# Patient Record
Sex: MALE | Race: BLACK OR AFRICAN AMERICAN
[De-identification: names, ages, dates, MRNs, and addresses within clinical notes are randomized per-mention and may not be internally consistent; named-entity substitution may affect disease eponyms.]

---

## 2017-02-04 ENCOUNTER — HOSPITAL ENCOUNTER (INPATIENT)
Dept: HOSPITAL 74 - YASAS | Age: 47
LOS: 5 days | Discharge: OTHER INSTITUTION | DRG: 773 | End: 2017-02-09
Attending: INTERNAL MEDICINE | Admitting: INTERNAL MEDICINE
Payer: COMMERCIAL

## 2017-02-04 VITALS — BODY MASS INDEX: 31.1 KG/M2

## 2017-02-04 DIAGNOSIS — F60.9: ICD-10-CM

## 2017-02-04 DIAGNOSIS — F43.10: ICD-10-CM

## 2017-02-04 DIAGNOSIS — F10.230: ICD-10-CM

## 2017-02-04 DIAGNOSIS — F11.23: Primary | ICD-10-CM

## 2017-02-04 DIAGNOSIS — Z59.0: ICD-10-CM

## 2017-02-04 DIAGNOSIS — F17.210: ICD-10-CM

## 2017-02-04 DIAGNOSIS — F13.230: ICD-10-CM

## 2017-02-04 DIAGNOSIS — F33.9: ICD-10-CM

## 2017-02-04 DIAGNOSIS — F19.24: ICD-10-CM

## 2017-02-04 DIAGNOSIS — F14.20: ICD-10-CM

## 2017-02-04 LAB
APPEARANCE UR: CLEAR
BILIRUB UR STRIP.AUTO-MCNC: NEGATIVE MG/DL
COLOR UR: YELLOW
KETONES UR QL STRIP: NEGATIVE
LEUKOCYTE ESTERASE UR QL STRIP.AUTO: NEGATIVE
NITRITE UR QL STRIP: NEGATIVE
PH UR: 6 [PH] (ref 5–8)
PROT UR QL STRIP: NEGATIVE
PROT UR QL STRIP: NEGATIVE
RBC # UR STRIP: NEGATIVE /UL
SP GR UR: 1.02 (ref 1–1.03)
UROBILINOGEN UR STRIP-MCNC: NEGATIVE E.U./DL (ref 0.2–1)

## 2017-02-04 RX ADMIN — Medication SCH MG: at 22:50

## 2017-02-04 SDOH — ECONOMIC STABILITY - HOUSING INSECURITY: HOMELESSNESS: Z59.0

## 2017-02-04 NOTE — EKG
Test Reason : 

Blood Pressure : ***/*** mmHG

Vent. Rate : 074 BPM     Atrial Rate : 074 BPM

   P-R Int : 156 ms          QRS Dur : 086 ms

    QT Int : 370 ms       P-R-T Axes : 063 033 022 degrees

   QTc Int : 410 ms

 

NORMAL SINUS RHYTHM

NORMAL ECG

NO PREVIOUS ECGS AVAILABLE

Confirmed by JAYRO CASTELLANO MD ([id number]) on 2/4/2017 4:52:22 PM

 

Referred By:             Confirmed By:JAYRO CASTELLANO MD

## 2017-02-04 NOTE — HP
COWS





- Scale


Resting Pulse: 1= OR 


Sweatin= Chills/Flushing


Restless Observation: 1= Difficult to Sit Still


Pupil Size: 0= Normal to Room Light


Bone or Joint Aches: 2= Severe Diffuse Aches


Runny Nose/ Eye Tearin= Runny Nose/Eyes


GI Upset > 30mins: 3= Vomiting/Diarrhea


Tremor Observation: 2= Slight Tremor Visible


Yawning Observation: 0= None


Anxiety or Irritability: 2=Irritable/Anxious


Goose Flesh Skin: 3=Piloerection


COWS Score: 17





CIWA Score





- CIWA Score


Nausea/Vomitin


Muscle Tremors: 4-Moderate,w/Arms Extend


Anxiety: 3


Agitation: 1-Slight > Activity


Paroxysmal Sweats: 3


Orientation: 0-Oriented


Tacttile Disturbances: 0-None


Auditory Disturbances: 0-None


Visual Disturbances: 0-None


Headache: 3-Moderate


CIWA-Ar Total Score: 19





Admission ROS S





- HPI


Chief Complaint: 


"I am here to get clean again."


Allergies/Adverse Reactions: 


 Allergies











Allergy/AdvReac Type Severity Reaction Status Date / Time


 


Fish Containing Products Allergy Severe Rash Verified 17 12:15


 


tomato [Tomato] Allergy Severe Vomiting Verified 17 12:15


 


liver Allergy Severe Vomiting Uncoded 17 12:15


 


squash Allergy Severe Nausea Uncoded 17 12:15


 


NKDA Allergy   Uncoded 17 12:15











History of Present Illness: 


Pt. is a 45 YO male here to Detox from Heroin and alcohol. Pt. has had several 

previous detox and Rehab admissions at [facility] over the last few years. Pt. also 

uses Xanax on a daily basis.


Exam Limitations: No Limitations





- Ebola screening


Have you traveled outside of the country in the last 21 days: No


Have you had contact with anyone from an Ebola affected area: No


Have you been sick,other than usual withdrawal symptoms: No


Do you have a fever: No





- Review of Systems


Constitutional: Chills, Diaphoresis, Fever, Loss of Appetite, Malaise, Night 

Sweats, Changes in sleep, Unintentional Wgt. Loss (Lost 5- 10 lbs. over last 

month.)


EENT: reports: Tearing, Nose Congestion, Sinus Pressure


Respiratory: reports: No Symptoms reported


Cardiac: reports: No Symptoms Reported


GI: reports: Diarrhea, Nausea, Poor Appetite, Vomiting, Abdominal cramping


: reports: No Symptoms Reported


Musculoskeletal: reports: Joint Pain, Muscle Pain, Joint Stiffness


Integumentary: reports: No Symptoms Reported


Neuro: reports: Headache, Tremors


Endocrine: reports: No Symptoms Reported


Hematology: reports: No Symptoms Reported


Psychiatric: reports: Judgement Intact, Mood/Affect Appropiate, Orientated x3, 

Agitated, Anxious


Other Systems: Reviewed and Negative





Patient History





- Patient Medical History


Hx Anemia: No


Hx Asthma: No


Hx Chronic Obstructive Pulmonary Disease (COPD): No


Hx Cancer: No


Hx Cardiac Disorders: No


Hx Congestive Heart Failure: No


Hx Hypertension: No


Hx Hypercholesterolemia: No


Hx Pacemaker: No


HX Cerebrovascular Accident: No


Hx Seizures: No


Hx Diabetes: No


Hx Gastrointestinal Disorders: No


Hx Liver Disease: No


Hx Genitourinary Disorders: No


Hx Sexually Transmitted Disorders: No


Hx Renal Disease (ESRD): No


Hx Thyroid Disease: No


Hx Human Immunodeficiency Virus (HIV): No (NEGATIVE HX; Last Tested approx. 3 

months ago.)


Hx Hepatitis C: No (NEGATIVE HX; Last Tested approx. 3 months ago.)


Hx Depression: No


Hx Suicide Attempt: No (PATIENT DENIES CURRENT SI / HI.)


Hx Bipolar Disorder: No


Hx Schizophrenia: No





- Patient Surgical History


Past Surgical History: No


Hx Neurologic Surgery: No


Hx Cataract Extraction: No


Hx Cardiac Surgery: No


Hx Lung Surgery: No


Hx Breast Surgery: No


Hx Breast Biopsy: No


Hx Abdominal Surgery: No


Hx Appendectomy: No


Hx Cholecystectomy: No


Hx Genitourinary Surgery: No


Hx Orthopedic Surgery: No


Anesthesia Reaction: No





- PPD History


Previous Implant?: Yes


Documented Results: Negative w/o proof


Implanted On Prior [facility] Admission?: Yes


Date: 11/18/15


Results: 0 mm


PPD to be Administered?: Yes





- Reproductive History


Patient is a Female of Child Bearing Age (11 -55 yrs old): No (PATIENT IS MALE.)





- Smoking Cessation


Smoking history: Former smoker


Have you smoked in the past 12 months: No


Aproximately how many cigarettes per day: 0


If you are a former smoker, when did you quit?: 2015


Cigars Per Day: 0


Hx Chewing Tobacco Use: No


Initiated information on smoking cessation: No





- Substances Abused


  ** Heroin


Route: Inhalation


Frequency: Daily


Amount used: 7 BAGS


Age of first use: 38


Date of Last Use: 17





  ** Cocaine


Route: Inhalation


Frequency: Daily


Amount used: 7 BAGS


Age of first use: 38


Date of Last Use: 17





  ** Alcohol


Route: Oral


Frequency: Daily


Amount used: 12 BEER BUDWEISER


Age of first use: 20


Date of Last Use: 17





  ** Alprazolam (Xanax)


Route: Oral


Frequency: Daily


Amount used: 7 STICKS


Age of first use: 25


Date of Last Use: 17





Family Disease History





- Family Disease History


Family History: Denies





Admission Physical Exam [facility]





- Vital Signs


Vital Signs: 


 Vital Signs - 24 hr











  17





  12:15


 


Temperature 97.5 F L


 


Pulse Rate 84


 


Respiratory 20





Rate 


 


Blood Pressure 114/71














- Physical


General Appearance: Yes: No Apparent Distress, Nourished, Appropriately Dressed

, Tremorous, Irritable, Anxious


HEENTM: Yes: Hearing grossly Normal, Normocephalic, Normal Voice, MERNA, Pharynx 

Normal


Respiratory: Yes: Chest Non-Tender, Lungs Clear, No Respiratory Distress


Neck: Yes: No masses,lesions,Nodules, Supple, Trachea in good position


Breast: Yes: Breast Exam Deferred


Cardiology: Yes: Regular Rhythm, Regular Rate, S1, S2


Abdominal: Yes: Normal Bowel Sounds, Non Tender, Soft


Genitourinary: Yes: Within Normal Limits


Back: Yes: Normal Inspection


Musculoskeletal: Yes: Gait Steady, Joint Stiffness


Extremities: Yes: Normal Inspection, Non-Tender, Tremors


Neurological: Yes: Fully Oriented, Alert, Normal Mood/Affect, Normal Response


Integumentary: Yes: Normal Color, Dry, Warm


Lymphatic: Yes: Within Normal Limits





- Diagnostic


(1) Alcohol dependence with uncomplicated withdrawal


Current Visit: Yes   Status: Acute





(2) Sedative, hypnotic or anxiolytic dependence with withdrawal, uncomplicated


Current Visit: Yes   Status: Acute





(3) Cocaine dependence, uncomplicated


Current Visit: Yes   Status: Acute





(4) Opioid dependence with withdrawal


Current Visit: Yes   Status: Acute








Cleared for Admission [facility]





- Detox or Rehab


[facility] Level of Care: Medically Managed


Detox Regimen/Protocol: Methadone/Librium





BHS Breath Alcohol Content


Breath Alcohol Content: 0





Urine Drug Screen





- Results


Drug Screen Negative: No


Urine Drug Screen Results: OPI-Opiates, MTD-Methadone

## 2017-02-05 LAB
ALBUMIN SERPL-MCNC: 3.4 G/DL (ref 3.4–5)
ALP SERPL-CCNC: 81 U/L (ref 45–117)
ALT SERPL-CCNC: 60 U/L (ref 12–78)
ANION GAP SERPL CALC-SCNC: 9 MMOL/L (ref 8–16)
AST SERPL-CCNC: 27 U/L (ref 15–37)
BILIRUB SERPL-MCNC: 0.3 MG/DL (ref 0.2–1)
CALCIUM SERPL-MCNC: 8.3 MG/DL (ref 8.5–10.1)
CO2 SERPL-SCNC: 27 MMOL/L (ref 21–32)
CREAT SERPL-MCNC: 1.1 MG/DL (ref 0.7–1.3)
DEPRECATED RDW RBC AUTO: 13.3 % (ref 11.9–15.9)
GLUCOSE SERPL-MCNC: 95 MG/DL (ref 74–106)
MCH RBC QN AUTO: 29.5 PG (ref 25.7–33.7)
MCHC RBC AUTO-ENTMCNC: 32.9 G/DL (ref 32–35.9)
MCV RBC: 89.8 FL (ref 80–96)
PLATELET # BLD AUTO: 142 K/MM3 (ref 134–434)
PMV BLD: 9.4 FL (ref 7.5–11.1)
PROT SERPL-MCNC: 6.6 G/DL (ref 6.4–8.2)
WBC # BLD AUTO: 5.4 K/MM3 (ref 4–10)

## 2017-02-05 RX ADMIN — Medication SCH MG: at 22:31

## 2017-02-05 RX ADMIN — Medication SCH TAB: at 10:32

## 2017-02-05 NOTE — PN
S CIWA





- CIWA Score


Nausea/Vomitin-Mild Nausea/No Vomiting


Muscle Tremors: 4-Moderate,w/Arms Extend


Anxiety: 4-Mod. Anxious/Guarded


Agitation: 4-Moderately Restless


Paroxysmal Sweats: No Perspiration


Orientation: 0-Oriented


Tacttile Disturbances: 1-Very Mild Itch/Numbness


Auditory Disturbances: 0-None


Visual Disturbances: 0-None


Headache: 2-Mild


CIWA-Ar Total Score: 16





BHS COWS





- Scale


Resting Pulse: 0= NV 80 or Below


Sweatin=Flushed/Facial Moisture


Restless Observation: 3= Extraneous Movement


Pupil Size: 0= Normal to Room Light


Bone or Joint Aches: 2= Severe Diffuse Aches


Runny Nose/ Eye Tearin= Nasal Congestion


GI Upset > 30mins: 3= Vomiting/Diarrhea


Tremor Observation of Outstretched Hands: 2= Slight Tremor Visible


Yawning Observation: 0= None


Anxiety or Irritability: 2=Irritable/Anxious


Goose Flesh Skin: 0=Smooth Skin


COWS Score: 15





BHS Progress Note (SOAP)


Subjective: 


Nausea, diarrhea, sweating, tremor, anxiety, restless, interrupted sleep (

patient request ambien 10mg tonight and psychiatry consult in AM for trazodone)


Objective: 





17 14:28


 Last Vital Signs











Temp Pulse Resp BP Pulse Ox


 


 96.8 F L  78   18   109/71    


 


 17 14:04  17 14:04  17 14:04  17 14:04   








 Laboratory Tests











  17





  19:00 07:20 07:20


 


WBC   5.4 


 


RBC   5.02 


 


Hgb   14.8 


 


Hct   45.0 


 


MCV   89.8 


 


MCHC   32.9 


 


RDW   13.3 


 


Plt Count   142 


 


MPV   9.4  D 


 


Sodium    138


 


Potassium    4.2


 


Chloride    102


 


Carbon Dioxide    27


 


Anion Gap    9


 


BUN    15


 


Creatinine    1.1  D


 


Creat Clearance w eGFR    > 60


 


Random Glucose    95  D


 


Calcium    8.3 L


 


Total Bilirubin    0.3  D


 


AST    27  D


 


ALT    60  D


 


Alkaline Phosphatase    81


 


Total Protein    6.6


 


Albumin    3.4


 


Urine Color  Yellow  


 


Urine Appearance  Clear  


 


Urine pH  6.0  


 


Ur Specific Gravity  1.024  


 


Urine Protein  Negative  


 


Urine Glucose (UA)  Negative  


 


Urine Ketones  Negative  


 


Urine Blood  Negative  


 


Urine Nitrite  Negative  


 


Urine Bilirubin  Negative  


 


Urine Urobilinogen  Negative  


 


Ur Leukocyte Esterase  Negative  


 


RPR Titer   














  17





  07:20


 


WBC 


 


RBC 


 


Hgb 


 


Hct 


 


MCV 


 


MCHC 


 


RDW 


 


Plt Count 


 


MPV 


 


Sodium 


 


Potassium 


 


Chloride 


 


Carbon Dioxide 


 


Anion Gap 


 


BUN 


 


Creatinine 


 


Creat Clearance w eGFR 


 


Random Glucose 


 


Calcium 


 


Total Bilirubin 


 


AST 


 


ALT 


 


Alkaline Phosphatase 


 


Total Protein 


 


Albumin 


 


Urine Color 


 


Urine Appearance 


 


Urine pH 


 


Ur Specific Gravity 


 


Urine Protein 


 


Urine Glucose (UA) 


 


Urine Ketones 


 


Urine Blood 


 


Urine Nitrite 


 


Urine Bilirubin 


 


Urine Urobilinogen 


 


Ur Leukocyte Esterase 


 


RPR Titer  Nonreactive








Labs noted


Assessment: 





17 14:28


Withdrawal symptoms


Plan: 


Continue detox


Ambien 10mg PO x 1 dose tonight and psychiatry consult in AM for rx for 

trazodone as per patient's request

## 2017-02-06 RX ADMIN — Medication SCH MG: at 22:50

## 2017-02-06 RX ADMIN — Medication SCH TAB: at 10:33

## 2017-02-06 RX ADMIN — METHADONE HYDROCHLORIDE SCH MG: 5 TABLET ORAL at 10:33

## 2017-02-06 NOTE — PN
S CIWA





- CIWA Score


Nausea/Vomitin


Muscle Tremors: 3


Anxiety: 2


Agitation: 3


Paroxysmal Sweats: 3


Orientation: 0-Oriented


Tacttile Disturbances: 0-None


Auditory Disturbances: 1-Very Mild


Visual Disturbances: 2-Mild Sensitivity


Headache: 0-None Present


CIWA-Ar Total Score: 19





BHS COWS





- Scale


Resting Pulse: 1= [place] 


Sweatin= Chills/Flushing


Restless Observation: 1= Difficult to Sit Still


Pupil Size: 0= Normal to Room Light


Bone or Joint Aches: 2= Severe Diffuse Aches


Runny Nose/ Eye Tearin= Nasal Congestion


GI Upset > 30mins: 3= Vomiting/Diarrhea


Tremor Observation of Outstretched Hands: 2= Slight Tremor Visible


Yawning Observation: 0= None


Anxiety or Irritability: 1=Feels Anxious/Irritable


Goose Flesh Skin: 3=Piloerection


COWS Score: 15





BHS Progress Note (SOAP)


Subjective: 


Sweating, Tremors, Interrupted sleep, Tremors, Vomiting, Body Aches.


Objective: 


PT. A &O X 3, OBSERVED AMBULATING ON UNIT.





17 14:00


 Vital Signs











Temperature  97.3 F L  17 13:24


 


Pulse Rate  86   17 13:24


 


Respiratory Rate  18   17 13:24


 


Blood Pressure  129/73   17 13:24


 


O2 Sat by Pulse Oximetry (%)      








 Laboratory Last Values











WBC  5.4 K/mm3 (4.0-10.0)   17  07:20    


 


RBC  5.02 M/mm3 (4.00-5.60)   17  07:20    


 


Hgb  14.8 GM/dL (11.7-16.9)   17  07:20    


 


Hct  45.0 % (35.4-49)   17  07:20    


 


MCV  89.8 fl (80-96)   17  07:20    


 


MCHC  32.9 g/dl (32.0-35.9)   17  07:20    


 


RDW  13.3 % (11.9-15.9)   17  07:20    


 


Plt Count  142 K/MM3 (134-434)   17  07:20    


 


MPV  9.4 fl (7.5-11.1)  D 17  07:20    


 


Sodium  138 mmol/L (136-145)   17  07:20    


 


Potassium  4.2 mmol/L (3.5-5.1)   17  07:20    


 


Chloride  102 mmol/L ()   17  07:20    


 


Carbon Dioxide  27 mmol/L (21-32)   17  07:20    


 


Anion Gap  9  (8-16)   17  07:20    


 


BUN  15 mg/dL (7-18)   17  07:20    


 


Creatinine  1.1 mg/dL (0.7-1.3)  D 17  07:20    


 


Creat Clearance w eGFR  > 60  (>60)   17  07:20    


 


Random Glucose  95 mg/dL ()  D 17  07:20    


 


Calcium  8.3 mg/dL (8.5-10.1)  L  17  07:20    


 


Total Bilirubin  0.3 mg/dL (0.2-1.0)  D 17  07:20    


 


AST  27 U/L (15-37)  D 17  07:20    


 


ALT  60 U/L (12-78)  D 17  07:20    


 


Alkaline Phosphatase  81 U/L ()   17  07:20    


 


Total Protein  6.6 g/dl (6.4-8.2)   17  07:20    


 


Albumin  3.4 g/dl (3.4-5.0)   17  07:20    


 


Urine Color  Yellow   17  19:00    


 


Urine Appearance  Clear   17  19:00    


 


Urine pH  6.0  (5.0-8.0)   17  19:00    


 


Ur Specific Gravity  1.024  (1.001-1.035)   17  19:00    


 


Urine Protein  Negative  (NEGATIVE)   17  19:00    


 


Urine Glucose (UA)  Negative  (NEGATIVE)   17  19:00    


 


Urine Ketones  Negative  (NEGATIVE)   17  19:00    


 


Urine Blood  Negative  (NEGATIVE)   17  19:00    


 


Urine Nitrite  Negative  (NEGATIVE)   17  19:00    


 


Urine Bilirubin  Negative  (NEGATIVE)   17  19:00    


 


Urine Urobilinogen  Negative E.U./dl (0.2-1.0)   17  19:00    


 


Ur Leukocyte Esterase  Negative  (NEGATIVE)   17  19:00    


 


RPR Titer  Nonreactive  (NONREACTIVE)   17  07:20    








LABS NOTED.





17 14:02





Assessment: 





17 14:01


WITHDRAWAL SYMPTOMS.


Plan: 


CONTINUE DETOX.
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
## 2017-02-07 RX ADMIN — Medication SCH MG: at 22:39

## 2017-02-07 RX ADMIN — METHADONE HYDROCHLORIDE SCH MG: 5 TABLET ORAL at 10:35

## 2017-02-07 RX ADMIN — Medication SCH TAB: at 10:34

## 2017-02-07 NOTE — PN
BHS Progress Note (SOAP)


Subjective: 


SWEATING,INTERRUPTED SLEEP,RESTLESS


Objective: 





02/07/17 11:07


 Vital Signs - 8 hr











  02/07/17 02/07/17





  06:39 09:54


 


Temperature 96.8 F L 98.3 F


 


Pulse Rate 108 H 95 H


 


Respiratory 18 18





Rate  


 


Blood Pressure 157/89 131/86








 Laboratory Tests











  02/04/17 02/05/17 02/05/17





  19:00 07:20 07:20


 


WBC   5.4 


 


RBC   5.02 


 


Hgb   14.8 


 


Hct   45.0 


 


MCV   89.8 


 


MCHC   32.9 


 


RDW   13.3 


 


Plt Count   142 


 


MPV   9.4  D 


 


Sodium    138


 


Potassium    4.2


 


Chloride    102


 


Carbon Dioxide    27


 


Anion Gap    9


 


BUN    15


 


Creatinine    1.1  D


 


Creat Clearance w eGFR    > 60


 


Random Glucose    95  D


 


Calcium    8.3 L


 


Total Bilirubin    0.3  D


 


AST    27  D


 


ALT    60  D


 


Alkaline Phosphatase    81


 


Total Protein    6.6


 


Albumin    3.4


 


Urine Color  Yellow  


 


Urine Appearance  Clear  


 


Urine pH  6.0  


 


Ur Specific Gravity  1.024  


 


Urine Protein  Negative  


 


Urine Glucose (UA)  Negative  


 


Urine Ketones  Negative  


 


Urine Blood  Negative  


 


Urine Nitrite  Negative  


 


Urine Bilirubin  Negative  


 


Urine Urobilinogen  Negative  


 


Ur Leukocyte Esterase  Negative  


 


RPR Titer   














  02/05/17





  07:20


 


WBC 


 


RBC 


 


Hgb 


 


Hct 


 


MCV 


 


MCHC 


 


RDW 


 


Plt Count 


 


MPV 


 


Sodium 


 


Potassium 


 


Chloride 


 


Carbon Dioxide 


 


Anion Gap 


 


BUN 


 


Creatinine 


 


Creat Clearance w eGFR 


 


Random Glucose 


 


Calcium 


 


Total Bilirubin 


 


AST 


 


ALT 


 


Alkaline Phosphatase 


 


Total Protein 


 


Albumin 


 


Urine Color 


 


Urine Appearance 


 


Urine pH 


 


Ur Specific Gravity 


 


Urine Protein 


 


Urine Glucose (UA) 


 


Urine Ketones 


 


Urine Blood 


 


Urine Nitrite 


 


Urine Bilirubin 


 


Urine Urobilinogen 


 


Ur Leukocyte Esterase 


 


RPR Titer  Nonreactive








LABS NOTED


Assessment: 





02/07/17 11:08


WITHDRAWAL SX.


Plan: 


CONTINUE DETOX

## 2017-02-07 NOTE — PN
B[facility] Progress Note


Note: 


Psychiatry


Attending's note :


Asked to see this patient.


Reason : oversedation.


Chart reviewed.Patient examined at bedside.


Lying in bed.Asleep.Answers to his name.


Aware of his surroundings.Conversant but slurred.


Markedly sedated.Able to follow simple commands.


Relates well with writer + another staff at bedside.


Normal vitals.No evidence of physical distress.Fair hygiene.


Intervention:


Seroquel is discontinued.


Nursing staff made aware.


Suggest adjustment of detox protocol.

## 2017-02-08 RX ADMIN — Medication SCH TAB: at 10:34

## 2017-02-08 RX ADMIN — Medication SCH MG: at 22:39

## 2017-02-08 NOTE — PN
BHS Progress Note


Note: 


Psychiatry


Attending's note:


Met with patient.


Doing well.Alert,steady,conversant and active.


No complaints.Fully awake.Well groomed.Sociable.


Mr Weston is requesting a lower dose of seroquel.Wants 50 mg/hs.


Discussed with the patient.Seroquel 50 mg po hs.Ordered.Normal vitals.

## 2017-02-08 NOTE — PN
BHS Progress Note (SOAP)


Subjective: 


SWEATING,INTERRUPTED SLEEP,RESTLESS


Objective: 





02/08/17 18:02


 Vital Signs - 8 hr











  02/08/17 02/08/17





  13:24 17:34


 


Temperature 97.1 F L 97.5 F L


 


Pulse Rate 91 H 90


 


Respiratory 20 20





Rate  


 


Blood Pressure 126/67 111/73








 Laboratory Tests











  02/04/17 02/05/17 02/05/17





  19:00 07:20 07:20


 


WBC   5.4 


 


RBC   5.02 


 


Hgb   14.8 


 


Hct   45.0 


 


MCV   89.8 


 


MCHC   32.9 


 


RDW   13.3 


 


Plt Count   142 


 


MPV   9.4  D 


 


Sodium    138


 


Potassium    4.2


 


Chloride    102


 


Carbon Dioxide    27


 


Anion Gap    9


 


BUN    15


 


Creatinine    1.1  D


 


Creat Clearance w eGFR    > 60


 


Random Glucose    95  D


 


Calcium    8.3 L


 


Total Bilirubin    0.3  D


 


AST    27  D


 


ALT    60  D


 


Alkaline Phosphatase    81


 


Total Protein    6.6


 


Albumin    3.4


 


Urine Color  Yellow  


 


Urine Appearance  Clear  


 


Urine pH  6.0  


 


Ur Specific Gravity  1.024  


 


Urine Protein  Negative  


 


Urine Glucose (UA)  Negative  


 


Urine Ketones  Negative  


 


Urine Blood  Negative  


 


Urine Nitrite  Negative  


 


Urine Bilirubin  Negative  


 


Urine Urobilinogen  Negative  


 


Ur Leukocyte Esterase  Negative  


 


RPR Titer   














  02/05/17





  07:20


 


WBC 


 


RBC 


 


Hgb 


 


Hct 


 


MCV 


 


MCHC 


 


RDW 


 


Plt Count 


 


MPV 


 


Sodium 


 


Potassium 


 


Chloride 


 


Carbon Dioxide 


 


Anion Gap 


 


BUN 


 


Creatinine 


 


Creat Clearance w eGFR 


 


Random Glucose 


 


Calcium 


 


Total Bilirubin 


 


AST 


 


ALT 


 


Alkaline Phosphatase 


 


Total Protein 


 


Albumin 


 


Urine Color 


 


Urine Appearance 


 


Urine pH 


 


Ur Specific Gravity 


 


Urine Protein 


 


Urine Glucose (UA) 


 


Urine Ketones 


 


Urine Blood 


 


Urine Nitrite 


 


Urine Bilirubin 


 


Urine Urobilinogen 


 


Ur Leukocyte Esterase 


 


RPR Titer  Nonreactive








LABS NOTED


Assessment: 





02/08/17 18:03


WITHDRAWAL SX


Plan: 


CONTINUE DETOX

## 2017-02-09 ENCOUNTER — HOSPITAL ENCOUNTER (INPATIENT)
Dept: HOSPITAL 74 - YASAS | Age: 47
LOS: 20 days | Discharge: HOME | DRG: 772 | End: 2017-03-01
Attending: PSYCHIATRY & NEUROLOGY | Admitting: PSYCHIATRY & NEUROLOGY
Payer: COMMERCIAL

## 2017-02-09 VITALS — TEMPERATURE: 98.6 F | DIASTOLIC BLOOD PRESSURE: 78 MMHG | SYSTOLIC BLOOD PRESSURE: 125 MMHG | HEART RATE: 97 BPM

## 2017-02-09 VITALS — BODY MASS INDEX: 33.2 KG/M2

## 2017-02-09 DIAGNOSIS — F43.10: ICD-10-CM

## 2017-02-09 DIAGNOSIS — F10.20: ICD-10-CM

## 2017-02-09 DIAGNOSIS — Z59.0: ICD-10-CM

## 2017-02-09 DIAGNOSIS — F33.9: ICD-10-CM

## 2017-02-09 DIAGNOSIS — F11.20: Primary | ICD-10-CM

## 2017-02-09 PROCEDURE — HZ2ZZZZ DETOXIFICATION SERVICES FOR SUBSTANCE ABUSE TREATMENT: ICD-10-PCS | Performed by: INTERNAL MEDICINE

## 2017-02-09 RX ADMIN — QUETIAPINE FUMARATE SCH MG: 100 TABLET ORAL at 21:36

## 2017-02-09 RX ADMIN — Medication SCH MG: at 21:36

## 2017-02-09 RX ADMIN — Medication SCH: at 10:35

## 2017-02-09 SDOH — ECONOMIC STABILITY - HOUSING INSECURITY: HOMELESSNESS: Z59.0
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
## 2017-02-09 NOTE — HP
[facility] MD Rehab Assess/Revision





- Admission History


Admitted to Rehab from: [facility]


Date of Admission to Rehab: 02/09/17





- Vital signs


Vital Signs: 


 Vital Signs











 Period  Temp  Pulse  Resp  BP Sys/Cruz  Pulse Ox


 


 Last 24 Hr  98.5 F  91  18  133/88  














- Findings


Detox History & Physical reviewed: Yes


Concur with findings: Yes


Comments/Additional Findings: trasnferred from detox to rehab admission as per 

protocol

## 2017-02-09 NOTE — DS
[facility] Detox Discharge Summary


Admission Date: 


02/04/17





Discharge Date: 02/09/17





- History


Present History: Alcohol Dependence, Cocaine Dependence, Opioid Dependence


Pertinent Past History: 


INSOMNIA





- Physical Exam Results


Vital Signs: 


 Vital Signs











Temperature  98.6 F   02/09/17 10:14


 


Pulse Rate  97 H  02/09/17 10:14


 


Respiratory Rate  18   02/09/17 10:14


 


Blood Pressure  125/78   02/09/17 10:14


 


O2 Sat by Pulse Oximetry (%)      











Pertinent Admission Physical Exam Findings: 


WITHDRAWAL SX.


 Laboratory Tests











  02/04/17 02/05/17 02/05/17





  19:00 07:20 07:20


 


WBC   5.4 


 


RBC   5.02 


 


Hgb   14.8 


 


Hct   45.0 


 


MCV   89.8 


 


MCHC   32.9 


 


RDW   13.3 


 


Plt Count   142 


 


MPV   9.4  D 


 


Sodium    138


 


Potassium    4.2


 


Chloride    102


 


Carbon Dioxide    27


 


Anion Gap    9


 


BUN    15


 


Creatinine    1.1  D


 


Creat Clearance w eGFR    > 60


 


Random Glucose    95  D


 


Calcium    8.3 L


 


Total Bilirubin    0.3  D


 


AST    27  D


 


ALT    60  D


 


Alkaline Phosphatase    81


 


Total Protein    6.6


 


Albumin    3.4


 


Urine Color  Yellow  


 


Urine Appearance  Clear  


 


Urine pH  6.0  


 


Ur Specific Gravity  1.024  


 


Urine Protein  Negative  


 


Urine Glucose (UA)  Negative  


 


Urine Ketones  Negative  


 


Urine Blood  Negative  


 


Urine Nitrite  Negative  


 


Urine Bilirubin  Negative  


 


Urine Urobilinogen  Negative  


 


Ur Leukocyte Esterase  Negative  


 


RPR Titer   














  02/05/17





  07:20


 


WBC 


 


RBC 


 


Hgb 


 


Hct 


 


MCV 


 


MCHC 


 


RDW 


 


Plt Count 


 


MPV 


 


Sodium 


 


Potassium 


 


Chloride 


 


Carbon Dioxide 


 


Anion Gap 


 


BUN 


 


Creatinine 


 


Creat Clearance w eGFR 


 


Random Glucose 


 


Calcium 


 


Total Bilirubin 


 


AST 


 


ALT 


 


Alkaline Phosphatase 


 


Total Protein 


 


Albumin 


 


Urine Color 


 


Urine Appearance 


 


Urine pH 


 


Ur Specific Gravity 


 


Urine Protein 


 


Urine Glucose (UA) 


 


Urine Ketones 


 


Urine Blood 


 


Urine Nitrite 


 


Urine Bilirubin 


 


Urine Urobilinogen 


 


Ur Leukocyte Esterase 


 


RPR Titer  Nonreactive








LABS NOTED





- Treatment


Hospital Course: Detox Protocol Followed, Detoxed Safely, Responded well, 

Discharged Condition Good, Rehab Referral Accepted





- Medication


Discharge Medications: 


Ambulatory Orders





Quetiapine Fumarate [Seroquel] 100 mg PO HS #30 tablet 02/06/17 











- Diagnosis


(1) Alcohol dependence with uncomplicated withdrawal


Current Visit: Yes   Status: Acute





(2) Cocaine dependence, uncomplicated


Current Visit: Yes   Status: Acute





(3) Opioid dependence with withdrawal


Current Visit: Yes   Status: Acute





(4) Sedative, hypnotic or anxiolytic dependence with withdrawal, uncomplicated


Current Visit: Yes   Status: Acute





(5) Personality disorder, unspecified


Current Visit: No   Status: Acute





(6) Substance induced mood disorder


Current Visit: No   Status: Acute





(7) Borderline personality disorder


Current Visit: Yes   Status: Suspected





(8) Nicotine dependence


Current Visit: No   Status: Chronic   Qualifiers: 


     Nicotine product type: cigarettes     Substance use status: uncomplicated 

       Qualified Code(s): F17.210 - Nicotine dependence, cigarettes, 

uncomplicated  








- AMA


Did Patient Leave Against Medical Advice: No

## 2017-02-10 RX ADMIN — Medication SCH MG: at 21:37

## 2017-02-10 RX ADMIN — QUETIAPINE FUMARATE SCH MG: 100 TABLET ORAL at 21:37

## 2017-02-10 RX ADMIN — Medication SCH TAB: at 09:39

## 2017-02-11 RX ADMIN — Medication SCH TAB: at 10:28

## 2017-02-11 RX ADMIN — Medication SCH MG: at 21:40

## 2017-02-11 RX ADMIN — QUETIAPINE FUMARATE SCH MG: 100 TABLET ORAL at 21:40

## 2017-02-12 RX ADMIN — Medication SCH: at 22:05

## 2017-02-12 RX ADMIN — QUETIAPINE FUMARATE SCH: 100 TABLET ORAL at 22:05

## 2017-02-12 RX ADMIN — Medication SCH TAB: at 10:22

## 2017-02-13 RX ADMIN — Medication SCH TAB: at 09:54

## 2017-02-13 RX ADMIN — Medication SCH MG: at 21:50

## 2017-02-13 RX ADMIN — QUETIAPINE FUMARATE SCH MG: 100 TABLET ORAL at 21:50

## 2017-02-14 RX ADMIN — Medication SCH MG: at 21:36

## 2017-02-14 RX ADMIN — Medication SCH TAB: at 09:55

## 2017-02-14 RX ADMIN — QUETIAPINE FUMARATE SCH MG: 100 TABLET ORAL at 21:36

## 2017-02-15 RX ADMIN — QUETIAPINE FUMARATE SCH MG: 100 TABLET ORAL at 21:55

## 2017-02-15 RX ADMIN — Medication SCH MG: at 21:55

## 2017-02-15 RX ADMIN — Medication SCH TAB: at 09:34

## 2017-02-16 RX ADMIN — Medication SCH TAB: at 09:38

## 2017-02-16 RX ADMIN — QUETIAPINE FUMARATE SCH MG: 100 TABLET ORAL at 22:06

## 2017-02-16 RX ADMIN — Medication SCH MG: at 22:06

## 2017-02-17 RX ADMIN — QUETIAPINE FUMARATE SCH MG: 100 TABLET ORAL at 21:45

## 2017-02-17 RX ADMIN — Medication SCH TAB: at 09:42

## 2017-02-17 RX ADMIN — Medication SCH MG: at 21:45

## 2017-02-17 NOTE — PN
Psychiatric Progress Note


Vital Signs: 


 Vital Signs











 Period  Temp  Pulse  Resp  BP Sys/Cruz  Pulse Ox


 


 Last 24 Hr  98.1 F  84  18-18  128/81  











Date of Session: 02/17/17


Chief Complaint:: depressed


HPI: Patient is addressing alcohol dependence comorbid PTSD and Depressive 

disorder.


ROS: WNL


Current Medications: 


Active Medications











Generic Name Dose Route Start Last Admin





  Trade Name Freq  PRN Reason Stop Dose Admin


 


Acetaminophen  650 mg 02/09/17 14:43  





  Tylenol -  PO   





  Q4H PRN   





  FEVER OR PAIN   


 


Al Hydroxide/Mg Hydroxide  30 ml 02/09/17 14:43  





  Mylanta Oral Suspension -  PO   





  Q6H PRN   





  DYSPEPSIA   


 


Bupropion HCl  150 mg 02/18/17 10:00  





  Wellbutrin Xl -  PO   





  DAILY LONDON   


 


Diphenhydramine HCl  50 mg 02/09/17 14:43 02/11/17 21:41





  Benadryl -  PO   50 mg





  HSMR1 PRN   Administration





  FOR ITCHING   


 


Eucalyptus/Menthol/Phenol/Sorbitol  1 each 02/09/17 14:43  





  Cepastat Lozenge -  MM   





  Q4H PRN   





  SORE THROAT   


 


Guaifenesin  10 ml 02/09/17 14:43  





  Robitussin Dm -  PO   





  Q6H PRN   





  COUGH   


 


Hydroxyzine Pamoate  25 mg 02/09/17 14:43  





  Vistaril -  PO   





  Q4H PRN   





  AGITATION   


 


Ibuprofen  400 mg 02/09/17 14:43  





  Motrin -  PO   





  Q6H PRN   





  PAIN   


 


Loperamide HCl  4 mg 02/09/17 14:43  





  Imodium -  PO   





  Q6H PRN   





  DIARRHEA   


 


Magnesium Hydroxide  30 ml 02/09/17 14:43  





  Milk Of Magnesia -  PO   





  DAILY PRN   





  CONSTIPATION   


 


Prenatal Multivit/Folic Acid/Iron  1 tab 02/10/17 10:00 02/17/17 09:42





  Prenatal Vitamins (Sjr) -  PO   1 tab





  DAILY LONDON   Administration


 


Pseudoephedrine/Triprolidine  1 combo 02/09/17 14:43  





  Actifed -  PO   





  TID PRN   





  NASAL CONGESTION   


 


Quetiapine Fumarate  100 mg 02/09/17 22:00 02/16/17 22:06





  Seroquel -  PO   100 mg





  HS LONDON   Administration


 


Thiamine HCl  100 mg 02/09/17 22:00 02/16/17 22:06





  Vitamin B1 -  PO   100 mg





  HS LONDON   Administration











Current Side Effect: No


Lab tests ordered: No


Lab tests reviewed: Yes


Provider note:: Patient reports he still depressed and feels that Wellbutrin 

100 mg po not effective that's why he he refused meds this am.Psycheducation 

regarding medication provided, will increase 150 mfg , continue to monitor 

progress.


Total face to face time:: 15





Mental Status Exam





- Mental Status Exam


Alert and Oriented to: Time, Place, Person


Cognitive Function: Good


Patient Appearance: Well Groomed


Mood: Depressed, Sad


Affect: Mood Congruent


Patient Behavior: Appropriate, Cooperative


Speech Pattern: Clear, Appropriate


Voice Loudness: Normal


Thought Process: Intact, Goal Oriented


Thought Disorder: Not Present


Hallucinations: Denies


Suicidal Ideation: Denies


Homicidal Ideation: Denies


Insight/Judgement: Fair


Sleep: Fair


Appetite: Good


Muscle strength/Tone: Normal


Gait/Station: Normal





Psychiatric Treatment Plan





- Problem List


(1) Alcohol dependence


Current Visit: Yes   





(2) PTSD


Current Visit: No





(3) Depressive disorder


Current Visit: Yes

## 2017-02-18 RX ADMIN — QUETIAPINE FUMARATE SCH MG: 100 TABLET ORAL at 21:49

## 2017-02-18 RX ADMIN — Medication SCH MG: at 21:49

## 2017-02-18 RX ADMIN — Medication SCH TAB: at 09:38

## 2017-02-19 RX ADMIN — QUETIAPINE FUMARATE SCH MG: 100 TABLET ORAL at 21:37

## 2017-02-19 RX ADMIN — Medication SCH TAB: at 10:34

## 2017-02-19 RX ADMIN — Medication SCH MG: at 21:37

## 2017-02-20 RX ADMIN — Medication SCH TAB: at 10:09

## 2017-02-20 RX ADMIN — Medication SCH MG: at 21:57

## 2017-02-20 RX ADMIN — QUETIAPINE FUMARATE SCH MG: 100 TABLET ORAL at 21:57

## 2017-02-21 RX ADMIN — Medication SCH MG: at 21:36

## 2017-02-21 RX ADMIN — QUETIAPINE FUMARATE SCH MG: 100 TABLET ORAL at 21:36

## 2017-02-21 RX ADMIN — Medication SCH TAB: at 09:44

## 2017-02-22 RX ADMIN — QUETIAPINE FUMARATE SCH MG: 100 TABLET ORAL at 21:46

## 2017-02-22 RX ADMIN — Medication SCH MG: at 21:46

## 2017-02-22 RX ADMIN — Medication SCH TAB: at 09:33

## 2017-02-23 RX ADMIN — Medication SCH TAB: at 09:32

## 2017-02-23 RX ADMIN — QUETIAPINE FUMARATE SCH MG: 100 TABLET ORAL at 21:47

## 2017-02-23 RX ADMIN — Medication SCH MG: at 21:47

## 2017-02-24 RX ADMIN — Medication SCH: at 21:31

## 2017-02-24 RX ADMIN — Medication SCH: at 09:26

## 2017-02-25 RX ADMIN — Medication SCH: at 10:12

## 2017-02-25 RX ADMIN — Medication SCH: at 21:56

## 2017-02-26 RX ADMIN — Medication SCH: at 21:33

## 2017-02-26 RX ADMIN — Medication SCH: at 10:22

## 2017-02-27 RX ADMIN — Medication SCH: at 09:29

## 2017-02-27 RX ADMIN — Medication SCH: at 21:37

## 2017-02-28 RX ADMIN — Medication SCH: at 21:40

## 2017-02-28 RX ADMIN — Medication SCH: at 09:30

## 2017-03-01 VITALS — TEMPERATURE: 98 F | DIASTOLIC BLOOD PRESSURE: 80 MMHG | SYSTOLIC BLOOD PRESSURE: 120 MMHG | HEART RATE: 81 BPM

## 2017-03-01 PROCEDURE — HZ41ZZZ GROUP COUNSELING FOR SUBSTANCE ABUSE TREATMENT, BEHAVIORAL: ICD-10-PCS | Performed by: PSYCHIATRY & NEUROLOGY

## 2017-03-01 RX ADMIN — Medication SCH: at 09:41

## 2017-03-01 NOTE — PN
Psychiatric Progress Note


Vital Signs: 


 Vital Signs











 Period  Temp  Pulse  Resp  BP Sys/Cruz  Pulse Ox


 


 Last 24 Hr  98.0 F  81  18-18  120/80  











Date of Session: 03/01/17


Chief Complaint:: discharge visit


HPI: Patient is addressing alcohol, opioid dependence comorbid PTSD and 

Depressive disorder.


ROS: WNL


Current Medications: 


Active Medications











Generic Name Dose Route Start Last Admin





  Trade Name Freq  PRN Reason Stop Dose Admin


 


Acetaminophen  650 mg 02/09/17 14:43  





  Tylenol -  PO   





  Q4H PRN   





  FEVER OR PAIN   


 


Al Hydroxide/Mg Hydroxide  30 ml 02/09/17 14:43 02/23/17 13:52





  Mylanta Oral Suspension -  PO   30 ml





  Q6H PRN   Administration





  DYSPEPSIA   


 


Diphenhydramine HCl  50 mg 02/09/17 14:43 02/22/17 21:46





  Benadryl -  PO   50 mg





  HSMR1 PRN   Administration





  FOR ITCHING   


 


Eucalyptus/Menthol/Phenol/Sorbitol  1 each 02/09/17 14:43  





  Cepastat Lozenge -  MM   





  Q4H PRN   





  SORE THROAT   


 


Guaifenesin  10 ml 02/09/17 14:43  





  Robitussin Dm -  PO   





  Q6H PRN   





  COUGH   


 


Hydroxyzine Pamoate  25 mg 02/09/17 14:43  





  Vistaril -  PO   





  Q4H PRN   





  AGITATION   


 


Ibuprofen  400 mg 02/09/17 14:43  





  Motrin -  PO   





  Q6H PRN   





  PAIN   


 


Loperamide HCl  4 mg 02/09/17 14:43  





  Imodium -  PO   





  Q6H PRN   





  DIARRHEA   


 


Magnesium Hydroxide  30 ml 02/09/17 14:43  





  Milk Of Magnesia -  PO   





  DAILY PRN   





  CONSTIPATION   


 


Prenatal Multivit/Folic Acid/Iron  1 tab 02/10/17 10:00 02/28/17 09:30





  Prenatal Vitamins (Sjr) -  PO   Not Given





  DAILY LONDON   


 


Pseudoephedrine/Triprolidine  1 combo 02/09/17 14:43  





  Actifed -  PO   





  TID PRN   





  NASAL CONGESTION   


 


Thiamine HCl  100 mg 02/09/17 22:00 02/28/17 21:40





  Vitamin B1 -  PO   Not Given





  HS LONDON   











Current Side Effect: No


Lab tests ordered: No


Lab tests reviewed: Yes


Provider note:: Patient has completed today his treatment and met his goals, 

will continue to address his issues at the next level of care([facility] rehabilitation treatment program), he gained insights into his 

addiction, focused on importance of changing attitude for the utilizations of 

supports and using alternative ways to cope with life stressors. Wellbutrin and 

Seroquel were discontiued by the patient's request on 2/23/17, patient is 

stable for discharge today.


Total face to face time:: 15





Mental Status Exam





- Mental Status Exam


Alert and Oriented to: Time, Place, Person


Cognitive Function: Good, Grossly Intact


Patient Appearance: Well Groomed


Mood: Hopeful


Affect: Appropriate, Mood Congruent


Patient Behavior: Appropriate, Cooperative


Speech Pattern: Clear, Appropriate


Voice Loudness: Normal


Thought Process: Intact, Goal Oriented


Thought Disorder: Not Present


Hallucinations: Denies


Suicidal Ideation: Denies


Homicidal Ideation: Denies


Insight/Judgement: Fair


Sleep: Well


Appetite: Good


Muscle strength/Tone: Normal


Gait/Station: Normal





Psychiatric Treatment Plan





- Problem List


(1) Alcohol dependence


Current Visit: Yes   





(2) PTSD


Current Visit: No





(3) Depressive disorder


Current Visit: Yes





(4) Opioid dependence


Current Visit: No

## 2023-03-23 ENCOUNTER — HOSPITAL ENCOUNTER (INPATIENT)
Dept: HOSPITAL 74 - YASAS | Age: 53
LOS: 5 days | Discharge: OTHER INSTITUTION | DRG: 773 | End: 2023-03-28
Attending: SURGERY | Admitting: ALLERGY & IMMUNOLOGY
Payer: COMMERCIAL

## 2023-03-23 VITALS — BODY MASS INDEX: 35.8 KG/M2

## 2023-03-23 DIAGNOSIS — Z56.0: ICD-10-CM

## 2023-03-23 DIAGNOSIS — F19.280: ICD-10-CM

## 2023-03-23 DIAGNOSIS — F14.20: ICD-10-CM

## 2023-03-23 DIAGNOSIS — F17.210: ICD-10-CM

## 2023-03-23 DIAGNOSIS — G47.00: ICD-10-CM

## 2023-03-23 DIAGNOSIS — F19.282: ICD-10-CM

## 2023-03-23 DIAGNOSIS — F11.23: Primary | ICD-10-CM

## 2023-03-23 DIAGNOSIS — Z59.00: ICD-10-CM

## 2023-03-23 DIAGNOSIS — F43.10: ICD-10-CM

## 2023-03-23 DIAGNOSIS — F19.24: ICD-10-CM

## 2023-03-23 LAB
ALBUMIN SERPL-MCNC: 4.1 G/DL (ref 3.4–5)
ALP SERPL-CCNC: 92 U/L (ref 45–117)
ALT SERPL-CCNC: 55 U/L (ref 13–61)
ANION GAP SERPL CALC-SCNC: 8 MMOL/L (ref 8–16)
AST SERPL-CCNC: 26 U/L (ref 15–37)
BILIRUB SERPL-MCNC: 0.6 MG/DL (ref 0.2–1)
BUN SERPL-MCNC: 20.6 MG/DL (ref 7–18)
CALCIUM SERPL-MCNC: 9.6 MG/DL (ref 8.5–10.1)
CHLORIDE SERPL-SCNC: 98 MMOL/L (ref 98–107)
CO2 SERPL-SCNC: 26 MMOL/L (ref 21–32)
CREAT SERPL-MCNC: 1.7 MG/DL (ref 0.55–1.3)
DEPRECATED RDW RBC AUTO: 15.7 % (ref 11.9–15.9)
GLUCOSE SERPL-MCNC: 116 MG/DL (ref 74–106)
HCT VFR BLD CALC: 43 % (ref 35.4–49)
HGB BLD-MCNC: 14.3 GM/DL (ref 11.7–16.9)
MCH RBC QN AUTO: 28.5 PG (ref 25.7–33.7)
MCHC RBC AUTO-ENTMCNC: 33.2 G/DL (ref 32–35.9)
MCV RBC: 85.9 FL (ref 80–96)
PLATELET # BLD AUTO: 181 10^3/UL (ref 134–434)
PMV BLD: 9 FL (ref 7.5–11.1)
PROT SERPL-MCNC: 7.7 G/DL (ref 6.4–8.2)
RBC # BLD AUTO: 5 M/MM3 (ref 4–5.6)
SODIUM SERPL-SCNC: 132 MMOL/L (ref 136–145)
WBC # BLD AUTO: 9.1 K/MM3 (ref 4–10)

## 2023-03-23 PROCEDURE — U0003 INFECTIOUS AGENT DETECTION BY NUCLEIC ACID (DNA OR RNA); SEVERE ACUTE RESPIRATORY SYNDROME CORONAVIRUS 2 (SARS-COV-2) (CORONAVIRUS DISEASE [COVID-19]), AMPLIFIED PROBE TECHNIQUE, MAKING USE OF HIGH THROUGHPUT TECHNOLOGIES AS DESCRIBED BY CMS-2020-01-R: HCPCS

## 2023-03-23 PROCEDURE — U0005 INFEC AGEN DETEC AMPLI PROBE: HCPCS

## 2023-03-23 PROCEDURE — HZ2ZZZZ DETOXIFICATION SERVICES FOR SUBSTANCE ABUSE TREATMENT: ICD-10-PCS | Performed by: SURGERY

## 2023-03-23 RX ADMIN — Medication SCH TAB: at 13:32

## 2023-03-23 RX ADMIN — Medication SCH MG: at 22:49

## 2023-03-23 SDOH — ECONOMIC STABILITY - INCOME SECURITY: UNEMPLOYMENT, UNSPECIFIED: Z56.0

## 2023-03-23 SDOH — ECONOMIC STABILITY - HOUSING INSECURITY: HOMELESSNESS UNSPECIFIED: Z59.00

## 2023-03-24 RX ADMIN — METHOCARBAMOL PRN MG: 500 TABLET ORAL at 10:45

## 2023-03-24 RX ADMIN — Medication SCH TAB: at 10:42

## 2023-03-24 RX ADMIN — HYDROXYZINE PAMOATE PRN MG: 25 CAPSULE ORAL at 10:43

## 2023-03-25 RX ADMIN — Medication SCH: at 00:22

## 2023-03-25 RX ADMIN — METHOCARBAMOL PRN MG: 500 TABLET ORAL at 10:41

## 2023-03-25 RX ADMIN — Medication SCH TAB: at 10:41

## 2023-03-25 RX ADMIN — Medication SCH: at 23:15

## 2023-03-26 RX ADMIN — METHOCARBAMOL PRN MG: 500 TABLET ORAL at 10:40

## 2023-03-26 RX ADMIN — Medication SCH TAB: at 10:38

## 2023-03-26 RX ADMIN — Medication SCH: at 23:04

## 2023-03-26 RX ADMIN — HYDROXYZINE PAMOATE PRN MG: 25 CAPSULE ORAL at 10:38

## 2023-03-27 VITALS — RESPIRATION RATE: 18 BRPM

## 2023-03-27 LAB
ALBUMIN SERPL-MCNC: 3 G/DL (ref 3.4–5)
BUN SERPL-MCNC: 8.2 MG/DL (ref 7–18)
CALCIUM SERPL-MCNC: 8.8 MG/DL (ref 8.5–10.1)
CHLORIDE SERPL-SCNC: 104 MMOL/L (ref 98–107)
CO2 SERPL-SCNC: 30 MMOL/L (ref 21–32)
CREAT SERPL-MCNC: 0.9 MG/DL (ref 0.55–1.3)
GLUCOSE SERPL-MCNC: 113 MG/DL (ref 74–106)
PHOSPHATE SERPL-MCNC: 2.4 MG/DL (ref 2.5–4.9)
SODIUM SERPL-SCNC: 137 MMOL/L (ref 136–145)

## 2023-03-27 RX ADMIN — HYDROXYZINE PAMOATE PRN MG: 25 CAPSULE ORAL at 09:29

## 2023-03-27 RX ADMIN — Medication SCH TAB: at 09:27

## 2023-03-27 RX ADMIN — METHOCARBAMOL PRN MG: 500 TABLET ORAL at 09:29

## 2023-03-27 RX ADMIN — Medication SCH: at 23:32

## 2023-03-28 ENCOUNTER — HOSPITAL ENCOUNTER (INPATIENT)
Dept: HOSPITAL 74 - YASAS | Age: 53
LOS: 14 days | Discharge: HOME | DRG: 772 | End: 2023-04-11
Attending: PSYCHIATRY & NEUROLOGY | Admitting: ALLERGY & IMMUNOLOGY
Payer: COMMERCIAL

## 2023-03-28 VITALS — TEMPERATURE: 97.3 F | DIASTOLIC BLOOD PRESSURE: 76 MMHG | SYSTOLIC BLOOD PRESSURE: 118 MMHG | HEART RATE: 88 BPM

## 2023-03-28 DIAGNOSIS — F60.9: ICD-10-CM

## 2023-03-28 DIAGNOSIS — F32.A: ICD-10-CM

## 2023-03-28 DIAGNOSIS — Z59.00: ICD-10-CM

## 2023-03-28 DIAGNOSIS — F19.280: ICD-10-CM

## 2023-03-28 DIAGNOSIS — F19.282: ICD-10-CM

## 2023-03-28 DIAGNOSIS — F11.20: Primary | ICD-10-CM

## 2023-03-28 DIAGNOSIS — F43.10: ICD-10-CM

## 2023-03-28 DIAGNOSIS — F41.9: ICD-10-CM

## 2023-03-28 DIAGNOSIS — F14.20: ICD-10-CM

## 2023-03-28 DIAGNOSIS — F17.210: ICD-10-CM

## 2023-03-28 DIAGNOSIS — F19.24: ICD-10-CM

## 2023-03-28 PROCEDURE — HZ42ZZZ GROUP COUNSELING FOR SUBSTANCE ABUSE TREATMENT, COGNITIVE-BEHAVIORAL: ICD-10-PCS | Performed by: PSYCHIATRY & NEUROLOGY

## 2023-03-28 RX ADMIN — Medication SCH MG: at 21:27

## 2023-03-28 RX ADMIN — Medication SCH TAB: at 10:45

## 2023-03-28 SDOH — ECONOMIC STABILITY - HOUSING INSECURITY: HOMELESSNESS UNSPECIFIED: Z59.00

## 2023-03-29 RX ADMIN — Medication SCH: at 21:25

## 2023-03-29 RX ADMIN — Medication SCH: at 11:05

## 2023-03-30 RX ADMIN — Medication SCH: at 10:45

## 2023-03-30 RX ADMIN — Medication SCH: at 21:28

## 2023-03-31 RX ADMIN — Medication SCH: at 21:38

## 2023-03-31 RX ADMIN — Medication SCH: at 10:35

## 2023-04-01 RX ADMIN — Medication SCH: at 22:34

## 2023-04-01 RX ADMIN — Medication SCH: at 10:19

## 2023-04-02 RX ADMIN — Medication SCH: at 22:33

## 2023-04-02 RX ADMIN — Medication SCH: at 09:52

## 2023-04-03 RX ADMIN — Medication SCH: at 22:04

## 2023-04-03 RX ADMIN — Medication SCH: at 11:11

## 2023-04-04 RX ADMIN — Medication SCH: at 10:25

## 2023-04-04 RX ADMIN — Medication SCH: at 21:47

## 2023-04-05 RX ADMIN — Medication SCH: at 22:50

## 2023-04-06 RX ADMIN — Medication SCH: at 21:26

## 2023-04-07 RX ADMIN — Medication SCH: at 21:31

## 2023-04-08 RX ADMIN — Medication SCH: at 22:26

## 2023-04-09 RX ADMIN — Medication SCH: at 22:21

## 2023-04-10 RX ADMIN — Medication SCH: at 21:46

## 2023-04-11 VITALS
TEMPERATURE: 97.3 F | RESPIRATION RATE: 16 BRPM | HEART RATE: 84 BPM | SYSTOLIC BLOOD PRESSURE: 118 MMHG | DIASTOLIC BLOOD PRESSURE: 80 MMHG